# Patient Record
(demographics unavailable — no encounter records)

---

## 2025-02-19 NOTE — HISTORY OF PRESENT ILLNESS
[de-identified] : 80 year old male presents with initial evaluation of Bilateral sensorineural hearing loss  Referred by Dr. Sean Montes De Oca ([facility]) History of Hyperlipidemia, hypertension coronary artery disease and prostate cancer s/p radiotherapy. Reports hearing loss for many years, wearing bilateral aids that are less effective  States hearing is becoming  No facial trauma or ear trauma.  Patient denies otalgia, otorrhea, ear infections, bleeding, ear surgeries, tinnitus, dizziness, vertigo, headaches related to hearing.  Audiogram 11/27/24 ([facility])  No recent scans

## 2025-02-19 NOTE — CONSULT LETTER
[FreeTextEntry2] : Sean Montes De Oca MD [FreeTextEntry1] :  Dear Sean,  Thanks for referring Mr. Sweeney for evaluation of his bilateral severe-profound hearing loss.  I very much agree with your assessment that he is likely to be a cochlear implant candidate.  I plan to set him up for formal CI evaluation and imaging, and we will see him back after testing is complete for further discussion.    Thanks again for your referral and I will let you know if he decides to proceed with surgery.  Sincerely,  Sebastián Carreon MD Otology/Neurotology [facility]

## 2025-02-19 NOTE — REASON FOR VISIT
[Initial Evaluation] : an initial evaluation for [Family Member] : family member [Patient Declined  Services] : - None: Patient declined  services [FreeTextEntry2] : Bilateral sensorineural hearing loss  [FreeTextEntry3] : Tamil speaking.  Patient declined offer of translation service. Patient preferred to use accompanying family member/friend for translation.  [TWNoteComboBox1] : False

## 2025-02-19 NOTE — PROCEDURE
[FreeTextEntry3] : Procedure note:  Binocular microscopy  Feb 19, 2025   Inspection of the ears was performed under binocular microscopy because of need to accurately visualize otologic landmarks and potential pathologic conditions that would not otherwise be visible through simple otoscopic exam.

## 2025-02-19 NOTE — ASSESSMENT
[FreeTextEntry1] : Otoscopic exam shows intact bilateral tympanic membrane without effusion or retraction.  Bilateral facial nerve function is normal.  I reviewed outside audiogram results which shows a bilateral downsloping severe to profound hearing loss with poor speech discrimination scores.  I reviewed other outside records including outside ENT office notes recommending evaluation for consideration of cochlear implant.  Discussed that the patient is potentially a cochlear implant candidate because of the severity of hearing loss, poor word discrimination scores, and lack of benefit from hearing aids.  Discussed how a cochlear implant differs from a standard hearing aid and provided visual example of a cochlear implant.  Discussed that the goal of the device was to improve sound clarity, but also that there was a wide range of outcomes.  Discussed the expected loss of some if not all residual hearing in the implanted ear after surgery.  Discussed limitations of the device in distinguishing pitch and music appreciation.  Discussed details of surgery and risks, as well as need for meningitis vaccinations beforehand.  Patient interested in moving forward with CI evaluation, will also obtain CT/MRI.

## 2025-03-04 NOTE — REASON FOR VISIT
[Initial] : initial visit for [Cochlear Implant] : cochlear implant [Family Member] : family member [Ad Hoc ] : provided by an ad hoc  [TWNoteComboBox1] : Other [Interpreters_Relationshiptopatient] : Son in law [FreeTextEntry3] : Language- Tamil.

## 2025-03-04 NOTE — PROCEDURE
[226 Hz] : 226 Hz [Normal Eardrum Mobility] : consistent with normal eardrum mobility [Type A Tympanogram] : Type A Normal [] : Audiogram: [de-identified] : Most recent audiological evaluation performed on 11/27/2024 [facility] indicated a moderately-severe to profound essentially mixed SNHL with poor speech discrimination (12%) bilaterally.

## 2025-03-04 NOTE — PROCEDURE
[226 Hz] : 226 Hz [Normal Eardrum Mobility] : consistent with normal eardrum mobility [Type A Tympanogram] : Type A Normal [] : Audiogram: [de-identified] : Most recent audiological evaluation performed on 11/27/2024 [facility] indicated a moderately-severe to profound essentially mixed SNHL with poor speech discrimination (12%) bilaterally.

## 2025-03-04 NOTE — ASSESSMENT
[FreeTextEntry1] : Aided testing was completed using patient's Audyssey hearing aids. Attempted testing with [facility] Resound BTE hearing aids programmed to NAL-NL2 targets. Similar results obtained for FM thresholds. Patient reported his own hearing aids to have better sound quality, therefore testing was continued with patient's own hearing aids.  Aided responses to FM tones were obtained from 40 dBHL - no response at 80 dBHL in the right aided condition and from 50 dBHL - no response at 80dBHL in the left aided condition, from 250-4000 Hz. This indicates that patient has limited to inadequate access to speech at a normal conversational level when aided with traditional hearing aids.   Aided speech recognition testing was completed, using recorded test materials. Scores were as follows:   CNC words at 55dBHL: Right aided- 0% Left aided- 0%   AZ Bio sentences at 55dBHL: Right aided- 0% Left aided- 0%   Based on the results of the above testing, Mr. Sweeney is considered an audiological candidate for a cochlear implant for either ear. Counseled patient and his son-in-law regarding cochlear implant process and discussed realistic expectations, including that we cannot predict potential benefit from cochlear implantation. Discussed importance of full time/consistent use of equipment as well as Aural Rehabilitation after activation of cochlear implant. Provided patient with information from CI .  If patient pursues cochlear implantation, interested in 2 N8s. Patient will discuss color and accessory options with his daughter.   Start time: 9:00am End time: 9:55am

## 2025-03-04 NOTE — HISTORY OF PRESENT ILLNESS
[FreeTextEntry1] : Mr. Sweeney, an 80-year-old male, was seen for a cochlear implant candidacy evaluation. Patient reported progressive bilateral hearing loss that has worsened since 2023. He is a long time hearing aid user and is currently fit with binaural Rexton hearing aids. Limited benefit reported from hearing aids. Family history of hearing loss reported. Patient denied tinnitus, vertigo, otalgia and history of trauma or surgery to the head and/or ears.  [FreeTextEntry8] : Patient reported that he is able to speak and understand English however has found it increasingly difficult to communicate in English due to the hearing loss. Patient able to answer some questions on his own in English however his son-in-law translated in patient's native language (Tamil) as needed throughout appointment.

## 2025-03-04 NOTE — ASSESSMENT
[FreeTextEntry1] : Aided testing was completed using patient's [employer]ex hearing aids. Attempted testing with [facility] Resound BTE hearing aids programmed to NAL-NL2 targets. Similar results obtained for FM thresholds. Patient reported his own hearing aids to have better sound quality, therefore testing was continued with patient's own hearing aids.  Aided responses to FM tones were obtained from 40 dBHL - no response at 80 dBHL in the right aided condition and from 50 dBHL - no response at 80dBHL in the left aided condition, from 250-4000 Hz. This indicates that patient has limited to inadequate access to speech at a normal conversational level when aided with traditional hearing aids.   Aided speech recognition testing was completed, using recorded test materials. Scores were as follows:   CNC words at 55dBHL: Right aided- 0% Left aided- 0%   AZ Bio sentences at 55dBHL: Right aided- 0% Left aided- 0%   Based on the results of the above testing, Mr. Sweeney is considered an audiological candidate for a cochlear implant for either ear. Counseled patient and his son-in-law regarding cochlear implant process and discussed realistic expectations, including that we cannot predict potential benefit from cochlear implantation. Discussed importance of full time/consistent use of equipment as well as Aural Rehabilitation after activation of cochlear implant. Provided patient with information from CI .  If patient pursues cochlear implantation, interested in 2 N8s. Patient will discuss color and accessory options with his daughter.   Start time: 9:00am End time: 9:55am